# Patient Record
Sex: FEMALE | Race: WHITE
[De-identification: names, ages, dates, MRNs, and addresses within clinical notes are randomized per-mention and may not be internally consistent; named-entity substitution may affect disease eponyms.]

---

## 2018-12-27 ENCOUNTER — HOSPITAL ENCOUNTER (OUTPATIENT)
Dept: HOSPITAL 53 - M RAD | Age: 43
End: 2018-12-27
Attending: NURSE PRACTITIONER
Payer: COMMERCIAL

## 2018-12-27 DIAGNOSIS — N60.02: Primary | ICD-10-CM

## 2018-12-27 DIAGNOSIS — N60.32: ICD-10-CM

## 2018-12-27 PROCEDURE — 76642 ULTRASOUND BREAST LIMITED: CPT

## 2018-12-27 PROCEDURE — 77065 DX MAMMO INCL CAD UNI: CPT

## 2018-12-27 NOTE — REP
DIAGNOSTIC MAMMOGRAM LEFT BREAST WITH 3D TOMOSYNTHESIS AND LEFT BREAST

ULTRASOUND:

 

Family history of breast cancer at age 48 in mother.

 

Tyrer-zi lifetime risk of breast cancer 18.4%. There is a palpable lump in

the inferomedial left breast.  The area is marked on the skin with a triangular

marker. Additional spot compression views of that area are performed.

 

There is moderately dense fibroglandular tissue which appears unchanged.  No mass

or clustered microcalcifications are seen in the left breast.

 

Real-time sonographic evaluation of the left breast performed at the site of the

palpable lump. There is a superficial subcutaneous complex cyst measuring 10 x 4

x 8 mm consistent with a sebaceous cyst.

 

IMPRESSION:

 

BI-RADS/ACR category 2 mammogram.  Benign finding(s).  Routine annual screening

mammography (for women over age 40).

 

No mass or clustered microcalcifications.  At the site of the palpable lump

inferomedially in the left breast is a subcutaneous complex cyst with a maximum

diameter of 1 cm consistent with a sebaceous cyst.

 

Recommend right breast mammography as the patient is due for a screening

mammogram of the right breast.

 

This mammogram was interpreted with the aid of an FDA-approved computer-aided

detection system.

 

A. Negative x-ray reports should not delay biopsy if a dominant or clinically

suspicious mass is present.

 

B. Four to eight percent of cancers are not identified by x-ray.

 

C. Adenosis and dense breasts may obscure an underlying neoplasm.

 

The patient states she/he had a clinical breast exam in December 2018.

 

The patient letter being requested is M2.

 

 

Electronically Signed by

Haider Tobias MD 12/27/2018 01:36 P

## 2019-01-04 ENCOUNTER — HOSPITAL ENCOUNTER (OUTPATIENT)
Dept: HOSPITAL 53 - M WHC | Age: 44
End: 2019-01-04
Attending: NURSE PRACTITIONER
Payer: COMMERCIAL

## 2019-01-04 DIAGNOSIS — Z12.31: Primary | ICD-10-CM

## 2019-01-04 NOTE — REPMRS
Patient History

The patient states she had a clinical breast exam in 12/2018.

Family history of breast cancer at age 48 in mother.

No Hormone Replacement Therapy

 

Digital Woman Screen Mammo: January 4, 2019 - Exam #: 

[id number]

Bilateral CC and MLO view(s) were taken.

 

Technologist: Lorena Diaz, Technologist

Prior study comparison: December 28, 2017, digital woman screen 

mammo performed at [facility].  November 23, 2016, 

digital woman screen mammo performed at [facility].

August 12, 2015, digital woman screen mammo performed at 

[facility].

 

FINDINGS: The breast tissue is heterogeneously dense.  This may 

lower the sensitivity of mammography.  There has been no change 

in the appearance of the right breast parenchyma in the interval 

since the prior examination. No mass, architectural distortion, 

or microcalcific cluster has developed. No suspicious finding.

3-D tomosynthesis shows no additional findings.

 

Assessment: BI-RADS/ACR category 2 mammogram. Benign finding(s).

 

Recommendation

Routine screening mammogram of both breasts in 1 year.

 

This patient's Lifetime Breast Cancer RIsk is estimated at 18.4 

%.

This mammogram was interpreted with the aid of an FDA-approved 

computer-aided dectection system.

 

 

 

Electronically Signed By: Elliot Firend MD 01/04/19 0959

## 2021-02-15 ENCOUNTER — HOSPITAL ENCOUNTER (OUTPATIENT)
Dept: HOSPITAL 53 - M SFHCWAGY | Age: 46
End: 2021-02-15
Attending: NURSE PRACTITIONER
Payer: COMMERCIAL

## 2021-02-15 ENCOUNTER — HOSPITAL ENCOUNTER (OUTPATIENT)
Dept: HOSPITAL 53 - M WHC | Age: 46
End: 2021-02-15
Attending: NURSE PRACTITIONER
Payer: COMMERCIAL

## 2021-02-15 DIAGNOSIS — Z12.4: Primary | ICD-10-CM

## 2021-02-15 DIAGNOSIS — Z12.31: Primary | ICD-10-CM

## 2021-02-15 NOTE — REPMRS
Patient History

The patient states she had a clinical breast exam in February 2021.

Family history of breast cancer at age 48 in mother.

No Hormone Replacement Therapy

 

Digital Woman Screen Mammo: February 15, 2021 - Exam #: 

[id number]

Bilateral CC and MLO view(s) were taken.

 

Technologist: RT Yennifer

Prior study comparison: January 4, 2019, bilateral digital woman 

screen mammo performed at [facility].  December 27, 2018, left breast digital mammo 

diagnostic unilateral, performed at [facility].  

December 28, 2017, digital woman screen mammo performed at 

[facility].  November 23, 2016, digital woman screen mammo performed at [facility].

 

FINDINGS: The breast tissue is heterogeneously dense.  This may 

lower the sensitivity of mammography.  The Volpara volumetric 

breast density category is: C.  There is a moderate amount of 

heterogeneously dense fibroglandular tissue which is fairly 

symmetric. There is no interval development of dominant mass, 

architectural distortion, or grouped microcalcification typical 

of malignancy. There has been no change in the appearance of the 

mammogram from the prior studies.

3-D tomosynthesis shows no additional findings.

 

Assessment: BI-RADS/ACR category 1 mammogram. Negative Mammogram.

 

Recommendation

Routine screening mammogram of both breasts in 1 year (for women 

over age 40).

This patient's [facility] Lifetime Breast Cancer RIsk is 

estimated at 18.0 %.

This mammogram was interpreted with the aid of an FDA-approved 

computer-aided dectection system.

 

Electronically Signed By: Elliot Friend MD 02/15/21 0468

## 2022-11-10 ENCOUNTER — HOSPITAL ENCOUNTER (OUTPATIENT)
Dept: HOSPITAL 53 - M PLALAB | Age: 47
End: 2022-11-10
Attending: NURSE PRACTITIONER
Payer: COMMERCIAL

## 2022-11-10 ENCOUNTER — HOSPITAL ENCOUNTER (OUTPATIENT)
Dept: HOSPITAL 53 - M WHC | Age: 47
End: 2022-11-10
Attending: NURSE PRACTITIONER
Payer: COMMERCIAL

## 2022-11-10 DIAGNOSIS — Z12.31: Primary | ICD-10-CM

## 2022-11-10 DIAGNOSIS — Z12.4: Primary | ICD-10-CM

## 2023-03-09 ENCOUNTER — HOSPITAL ENCOUNTER (OUTPATIENT)
Dept: HOSPITAL 53 - M WHC | Age: 48
End: 2023-03-09
Attending: NURSE PRACTITIONER
Payer: COMMERCIAL

## 2023-03-09 DIAGNOSIS — R10.2: Primary | ICD-10-CM
